# Patient Record
Sex: FEMALE | Race: WHITE
[De-identification: names, ages, dates, MRNs, and addresses within clinical notes are randomized per-mention and may not be internally consistent; named-entity substitution may affect disease eponyms.]

---

## 2022-06-13 ENCOUNTER — HOSPITAL ENCOUNTER (EMERGENCY)
Dept: HOSPITAL 75 - ER | Age: 42
Discharge: HOME | End: 2022-06-13
Payer: COMMERCIAL

## 2022-06-13 VITALS — WEIGHT: 158.73 LBS | BODY MASS INDEX: 25.82 KG/M2 | HEIGHT: 65.75 IN

## 2022-06-13 VITALS — DIASTOLIC BLOOD PRESSURE: 67 MMHG | SYSTOLIC BLOOD PRESSURE: 117 MMHG

## 2022-06-13 DIAGNOSIS — M50.222: ICD-10-CM

## 2022-06-13 DIAGNOSIS — M79.89: ICD-10-CM

## 2022-06-13 DIAGNOSIS — R07.2: Primary | ICD-10-CM

## 2022-06-13 DIAGNOSIS — Z82.49: ICD-10-CM

## 2022-06-13 DIAGNOSIS — Z79.52: ICD-10-CM

## 2022-06-13 DIAGNOSIS — T40.2X6A: ICD-10-CM

## 2022-06-13 DIAGNOSIS — Z79.899: ICD-10-CM

## 2022-06-13 DIAGNOSIS — D72.829: ICD-10-CM

## 2022-06-13 DIAGNOSIS — M54.9: ICD-10-CM

## 2022-06-13 DIAGNOSIS — Z91.14: ICD-10-CM

## 2022-06-13 LAB
ALBUMIN SERPL-MCNC: 4.2 GM/DL (ref 3.2–4.5)
ALP SERPL-CCNC: 73 U/L (ref 40–136)
ALT SERPL-CCNC: 25 U/L (ref 0–55)
APTT BLD: 24 SEC (ref 24–35)
BASOPHILS # BLD AUTO: 0.1 10^3/UL (ref 0–0.1)
BASOPHILS NFR BLD AUTO: 0 % (ref 0–10)
BILIRUB SERPL-MCNC: 0.1 MG/DL (ref 0.1–1)
BUN/CREAT SERPL: 14
CALCIUM SERPL-MCNC: 9 MG/DL (ref 8.5–10.1)
CHLORIDE SERPL-SCNC: 103 MMOL/L (ref 98–107)
CO2 SERPL-SCNC: 24 MMOL/L (ref 21–32)
CREAT SERPL-MCNC: 0.91 MG/DL (ref 0.6–1.3)
EOSINOPHIL # BLD AUTO: 0 10^3/UL (ref 0–0.3)
EOSINOPHIL NFR BLD AUTO: 0 % (ref 0–10)
GFR SERPLBLD BASED ON 1.73 SQ M-ARVRAT: 81 ML/MIN
GLUCOSE SERPL-MCNC: 107 MG/DL (ref 70–105)
HCT VFR BLD CALC: 41 % (ref 35–52)
HGB BLD-MCNC: 13.4 G/DL (ref 11.5–16)
INR PPP: 0.9 (ref 0.8–1.4)
LYMPHOCYTES # BLD AUTO: 1.8 10^3/UL (ref 1–4)
LYMPHOCYTES NFR BLD AUTO: 10 % (ref 12–44)
MAGNESIUM SERPL-MCNC: 2.3 MG/DL (ref 1.6–2.4)
MANUAL DIFFERENTIAL PERFORMED BLD QL: YES
MCH RBC QN AUTO: 31 PG (ref 25–34)
MCHC RBC AUTO-ENTMCNC: 33 G/DL (ref 32–36)
MCV RBC AUTO: 95 FL (ref 80–99)
MONOCYTES # BLD AUTO: 1 10^3/UL (ref 0–1)
MONOCYTES NFR BLD AUTO: 6 % (ref 0–12)
MONOCYTES NFR BLD: 5 %
NEUTROPHILS # BLD AUTO: 15.3 10^3/UL (ref 1.8–7.8)
NEUTROPHILS NFR BLD AUTO: 81 % (ref 42–75)
NEUTS BAND NFR BLD MANUAL: 88 %
PLATELET # BLD: 412 10^3/UL (ref 130–400)
PMV BLD AUTO: 9.4 FL (ref 9–12.2)
POTASSIUM SERPL-SCNC: 3.9 MMOL/L (ref 3.6–5)
PROT SERPL-MCNC: 6.9 GM/DL (ref 6.4–8.2)
PROTHROMBIN TIME: 12.6 SEC (ref 12.2–14.7)
RBC MORPH BLD: NORMAL
SODIUM SERPL-SCNC: 141 MMOL/L (ref 135–145)
VARIANT LYMPHS NFR BLD MANUAL: 7 %
WBC # BLD AUTO: 18.8 10^3/UL (ref 4.3–11)

## 2022-06-13 PROCEDURE — 83874 ASSAY OF MYOGLOBIN: CPT

## 2022-06-13 PROCEDURE — 93041 RHYTHM ECG TRACING: CPT

## 2022-06-13 PROCEDURE — 85730 THROMBOPLASTIN TIME PARTIAL: CPT

## 2022-06-13 PROCEDURE — 83735 ASSAY OF MAGNESIUM: CPT

## 2022-06-13 PROCEDURE — 71045 X-RAY EXAM CHEST 1 VIEW: CPT

## 2022-06-13 PROCEDURE — 85379 FIBRIN DEGRADATION QUANT: CPT

## 2022-06-13 PROCEDURE — 36415 COLL VENOUS BLD VENIPUNCTURE: CPT

## 2022-06-13 PROCEDURE — 83880 ASSAY OF NATRIURETIC PEPTIDE: CPT

## 2022-06-13 PROCEDURE — 93005 ELECTROCARDIOGRAM TRACING: CPT

## 2022-06-13 PROCEDURE — 84484 ASSAY OF TROPONIN QUANT: CPT

## 2022-06-13 PROCEDURE — 85007 BL SMEAR W/DIFF WBC COUNT: CPT

## 2022-06-13 PROCEDURE — 85610 PROTHROMBIN TIME: CPT

## 2022-06-13 PROCEDURE — 85027 COMPLETE CBC AUTOMATED: CPT

## 2022-06-13 PROCEDURE — 80053 COMPREHEN METABOLIC PANEL: CPT

## 2022-06-13 NOTE — DIAGNOSTIC IMAGING REPORT
EXAM: CHEST 1 VIEW, AP/PA ONLY



INDICATION: Chest pain.



COMPARISON: None.



FINDINGS: Normal heart size and pulmonary vascularity. No dense

consolidation, pleural effusion or pneumothorax. No acute osseous

findings.



IMPRESSION: Negative chest.



Dictated by: 



  Dictated on workstation # [id number]

## 2022-06-13 NOTE — ED CHEST PAIN
General


Stated Complaint:  SOB/CP/CONCERN ABOUT BLOOD CLOTS


Source:  patient


Exam Limitations:  no limitations





History of Present Illness


Date Seen by Provider:  Jun 13, 2022


Time Seen by Provider:  19:48


Initial Comments


Patient is a 41-year-old female with a history of cervical bulging disc who 

presents to ED with chest pain, shortness of breath and leg swelling.  Symptoms 

have been going on since Thursday.  She has intermittent chest pressure which 

she does get some relief with certain movements.  Pain described as tight with 

radiation to the back with intermittent substernal chest pressure.  Associated 

shortness of breath.  She noticed bilateral leg swelling currently on prednisone

for the past 7 days secondary to her neck pain.  She does have some current neck

pain.  She states leg swelling improved some.  She is on her last dose of 

prednisone today.  History of smoking family cardiac history.  No history of 

blood disorders, recent travels or surgeries.  She reports immobilization by 

laying around at home.  Was sent to the ED for the shortness of breath and chest

pain for further evaluation





Allergies and Home Medications


Allergies


Coded Allergies:  


     No Known Drug Allergies (Unverified , 6/13/22)





Patient Home Medication List


Home Medication List Reviewed:  Yes





Review of Systems


Review of Systems


Constitutional:  No chills, No diaphoresis, No malaise


EENTM:  No Double Vision, No Eye Pain


Respiratory:  Denies Cough, Denies Orthopnea; Shortness of Air


Cardiovascular:  Chest Pain


Gastrointestinal:  Denies Abdominal Pain, Denies Diarrhea, Denies Nausea, Denies

Other


Genitourinary:  Denies Burning, Denies Discharge


Musculoskeletal:  No back pain, No joint pain


Skin:  No change in color, No change in hair/nails





All Other Systems Reviewed


Negative Unless Noted:  Yes





Physical Exam


Vital Signs





Vital Signs - First Documented








 6/13/22





 19:54


 


Pulse 91


 


Resp 18


 


B/P (MAP) 139/92 (108)


 


Pulse Ox 97


 


O2 Delivery Room Air





Capillary Refill :


Height, Weight, BMI


Height: '"


Weight: lbs. oz. kg;  BMI


Method:


General Appearance:  No Apparent Distress, WD/WN


HEENT:  PERRL/EOMI, TMs Normal, Normal ENT Inspection, Pharynx Normal


Neck:  Full Range of Motion, Normal Inspection, Non Tender, Supple


Respiratory:  Chest Non Tender, Lungs Clear, Normal Breath Sounds, No Accessory 

Muscle Use, No Respiratory Distress


Cardiovascular:  Regular Rate, Rhythm, No Edema, No Gallop, No JVD


Gastrointestinal:  Normal Bowel Sounds, No Organomegaly, No Pulsatile Mass, Non 

Tender, Soft


Neurologic/Psychiatric:  Alert, Oriented x3, No Motor/Sensory Deficits, Normal 

Mood/Affect, CNs II-XII Norm as Tested


Skin:  Normal Color, Warm/Dry





Progress/Results/Core Measures


Results/Orders


Lab Results





Laboratory Tests








Test


 6/13/22


19:44 Range/Units


 


 


White Blood Count


 18.8 H


 4.3-11.0


10^3/uL


 


Red Blood Count


 4.29 


 3.80-5.11


10^6/uL


 


Hemoglobin 13.4  11.5-16.0  g/dL


 


Hematocrit 41  35-52  %


 


Mean Corpuscular Volume 95  80-99  fL


 


Mean Corpuscular Hemoglobin 31  25-34  pg


 


Mean Corpuscular Hemoglobin


Concent 33 


 32-36  g/dL





 


Red Cell Distribution Width 14.0  10.0-14.5  %


 


Platelet Count


 412 H


 130-400


10^3/uL


 


Mean Platelet Volume 9.4  9.0-12.2  fL


 


Immature Granulocyte % (Auto) 3   %


 


Neutrophils (%) (Auto) 81 H 42-75  %


 


Lymphocytes (%) (Auto) 10 L 12-44  %


 


Monocytes (%) (Auto) 6  0-12  %


 


Eosinophils (%) (Auto) 0  0-10  %


 


Basophils (%) (Auto) 0  0-10  %


 


Neutrophils # (Auto)


 15.3 H


 1.8-7.8


10^3/uL


 


Lymphocytes # (Auto)


 1.8 


 1.0-4.0


10^3/uL


 


Monocytes # (Auto)


 1.0 


 0.0-1.0


10^3/uL


 


Eosinophils # (Auto)


 0.0 


 0.0-0.3


10^3/uL


 


Basophils # (Auto)


 0.1 


 0.0-0.1


10^3/uL


 


Immature Granulocyte # (Auto)


 0.6 H


 0.0-0.1


10^3/uL


 


Neutrophils % (Manual) 88   %


 


Lymphocytes % (Manual) 7   %


 


Monocytes % (Manual) 5   %


 


Blood Morphology Comment NORMAL   


 


Prothrombin Time 12.6  12.2-14.7  SEC


 


INR Comment 0.9  0.8-1.4  


 


Activated Partial


Thromboplast Time 24 


 24-35  SEC





 


D-Dimer


 < 0.22 


 0.00-0.49


UG/ML


 


Sodium Level 141  135-145  MMOL/L


 


Potassium Level 3.9  3.6-5.0  MMOL/L


 


Chloride Level 103    MMOL/L


 


Carbon Dioxide Level 24  21-32  MMOL/L


 


Anion Gap 14  5-14  MMOL/L


 


Blood Urea Nitrogen 13  7-18  MG/DL


 


Creatinine


 0.91 


 0.60-1.30


MG/DL


 


Estimat Glomerular Filtration


Rate 81 


  





 


BUN/Creatinine Ratio 14   


 


Glucose Level 107 H   MG/DL


 


Calcium Level 9.0  8.5-10.1  MG/DL


 


Corrected Calcium 8.8  8.5-10.1  MG/DL


 


Magnesium Level 2.3  1.6-2.4  MG/DL


 


Total Bilirubin 0.1  0.1-1.0  MG/DL


 


Aspartate Amino Transf


(AST/SGOT) 18 


 5-34  U/L





 


Alanine Aminotransferase


(ALT/SGPT) 25 


 0-55  U/L





 


Alkaline Phosphatase 73    U/L


 


Myoglobin


 14.4 


 10.0-92.0


NG/ML


 


Troponin I < 0.028  <0.028  NG/ML


 


B-Type Natriuretic Peptide 22.7  <100.0  PG/ML


 


Total Protein 6.9  6.4-8.2  GM/DL


 


Albumin 4.2  3.2-4.5  GM/DL








My Orders





Orders - LEAH CHANDRA PA


Ekg Tracing (6/13/22 19:29)


Cbc With Automated Diff (6/13/22 19:37)


Magnesium (6/13/22 19:37)


Chest 1 View, Ap/Pa Only (6/13/22 19:37)


Ekg Tracing (6/13/22 19:37)


Comprehensive Metabolic Panel (6/13/22 19:37)


Myoglobin Serum (6/13/22 19:37)


Protime With Inr (6/13/22 19:37)


Partial Thromboplastin Time (6/13/22 19:37)


O2 (6/13/22 19:37)


Monitor-Rhythm Ecg Trace Only (6/13/22 19:37)


Ed Iv/Invasive Line Start (6/13/22 19:37)


Bnp Grady (6/13/22 19:37)


Fibrin Degradation Products (6/13/22 19:37)


Troponin I Floyd (6/13/22 19:37)


Manual Differential (6/13/22 19:44)


Fentanyl  Inj (Sublimaze Injection) (6/13/22 20:55)


Fentanyl  Inj (Sublimaze Injection) (6/13/22 21:03)





Vital Signs/I&O











 6/13/22 6/13/22





 19:54 21:40


 


Pulse 91 87


 


Resp 18 18


 


B/P (MAP) 139/92 (108) 117/67


 


Pulse Ox 97 98


 


O2 Delivery Room Air Room Air








Comment


Sinus rhythm, 94 bpm, QRS duration 81 MS, QTc 395 MS





Departure


Communication (PCP)


Patient heart score of 1.  EKG normal sinus rhythm.  No evidence of arrhythmia, 

ST elevation depression.  Normal troponin and cardiac enzymes.  Negative D-

dimer.  She does have some mild leg swelling bilateral.  Currently on 

prednisone.  Leukocytosis noted likely marginated with elevated neutrophils.  

Chemistry otherwise unremarkable.  Chest x-ray unremarkable negative for 

pneumonia, pneumothorax.  She has been having cervical neck pain back pain which

is likely resulting in chest pressure.  She does appear anxious regarding this. 

She has not been taking her pain medication hydrocodone on a regular basis.  

Concerning that pain has not been controlled.  She was given fentanyl with some 

improvement.  She is currently on gabapentin and muscle relaxer.  She is 

scheduled to follow-up with neurosurgery for further evaluation.  She has had 

epidurals in her neck in the past.  She reports C5-C6 disc bulging.  She has no 

recent trauma.  She is requesting discharge and will follow up with her primary 

care physician for further evaluation.  Discussed taking her hydrocodone which 

her last dose was on Thursday.  She her last dose of prednisone today which will

likely help with the swelling.  Return precaution were discussed with patient.





Impression





   Primary Impression:  


   Chest pain


   Additional Impression:  


   Back pain


Disposition:  01 HOME, SELF-CARE


Condition:  Stable





Departure-Patient Inst.


Decision time for Depature:  21:18


Referrals:  


NGOZI QUINONES NP (PCP/Family)


Primary Care Physician


Patient Instructions:  Upper Back Pain ED





Add. Discharge Instructions:  


Follow-up with your primary care physician for further evaluation











LEAH CHANDRA          Jun 13, 2022 19:51